# Patient Record
Sex: MALE | Race: WHITE | NOT HISPANIC OR LATINO
[De-identification: names, ages, dates, MRNs, and addresses within clinical notes are randomized per-mention and may not be internally consistent; named-entity substitution may affect disease eponyms.]

---

## 2018-11-09 ENCOUNTER — TRANSCRIPTION ENCOUNTER (OUTPATIENT)
Age: 18
End: 2018-11-09

## 2019-01-08 ENCOUNTER — TRANSCRIPTION ENCOUNTER (OUTPATIENT)
Age: 19
End: 2019-01-08

## 2020-02-10 ENCOUNTER — TRANSCRIPTION ENCOUNTER (OUTPATIENT)
Age: 20
End: 2020-02-10

## 2020-05-14 ENCOUNTER — EMERGENCY (EMERGENCY)
Facility: HOSPITAL | Age: 20
LOS: 1 days | Discharge: HOME | End: 2020-05-14
Attending: EMERGENCY MEDICINE | Admitting: EMERGENCY MEDICINE
Payer: COMMERCIAL

## 2020-05-14 ENCOUNTER — TRANSCRIPTION ENCOUNTER (OUTPATIENT)
Age: 20
End: 2020-05-14

## 2020-05-14 VITALS
HEIGHT: 68 IN | SYSTOLIC BLOOD PRESSURE: 128 MMHG | TEMPERATURE: 98 F | DIASTOLIC BLOOD PRESSURE: 77 MMHG | OXYGEN SATURATION: 100 % | WEIGHT: 190.04 LBS | RESPIRATION RATE: 16 BRPM | HEART RATE: 108 BPM

## 2020-05-14 PROCEDURE — 99283 EMERGENCY DEPT VISIT LOW MDM: CPT

## 2020-05-14 PROCEDURE — 70160 X-RAY EXAM OF NASAL BONES: CPT

## 2020-05-14 PROCEDURE — 99283 EMERGENCY DEPT VISIT LOW MDM: CPT | Mod: 25

## 2020-05-14 PROCEDURE — 70160 X-RAY EXAM OF NASAL BONES: CPT | Mod: 26

## 2020-05-14 RX ORDER — IBUPROFEN 200 MG
600 TABLET ORAL ONCE
Refills: 0 | Status: COMPLETED | OUTPATIENT
Start: 2020-05-14 | End: 2020-05-14

## 2020-05-14 RX ADMIN — Medication 600 MILLIGRAM(S): at 15:57

## 2020-05-14 NOTE — ED ADULT NURSE NOTE - NSIMPLEMENTINTERV_GEN_ALL_ED
Implemented All Universal Safety Interventions:  [place] to call system. Call bell, personal items and telephone within reach. Instruct patient to call for assistance. Room bathroom lighting operational. Non-slip footwear when patient is off stretcher. Physically safe environment: no spills, clutter or unnecessary equipment. Stretcher in lowest position, wheels locked, appropriate side rails in place.

## 2020-05-14 NOTE — ED PROVIDER NOTE - OBJECTIVE STATEMENT
pt is a 20yo male with no significant pmhx presents with nose injury x today. pt reports he ran into a pole while playing basketball. pt reports epistaxis and nose pain. pt denies loc. pt did not take anything for symptoms. pt denies fever, vision changes, cp, sob. tetanus utd

## 2020-05-14 NOTE — ED PROVIDER NOTE - NOSE FINDINGS
CLOTTED NASAL BLOOD/no inflammation/no rhinorrhea/+ nose with deformity ttp and ecchymosis. no septal hematoma.

## 2020-05-14 NOTE — ED PROVIDER NOTE - CARE PROVIDER_API CALL
Jassi Richmond  PLASTIC SURGERY  [address] [place] [address]  Phone: [phone]  Fax: [phone]  Follow Up Time: 1-3 Days

## 2020-05-14 NOTE — ED PROVIDER NOTE - PATIENT PORTAL LINK FT
You can access the FollowMyHealth Patient Portal offered by [facility] by registering at the following website: [email or web address]. By joining [facility]’s FollowMyHealth portal, you will also be able to view your health information using other applications (apps) compatible with our system.

## 2020-05-14 NOTE — ED PROVIDER NOTE - NSFOLLOWUPINSTRUCTIONS_ED_ALL_ED_FT
1. FOLLOW UP WITH YOUR PRIMARY DOCTOR IN 24-48 HOURS.   2. FOLLOW UP WITH ALL SPECIALIST DISCUSSED DURING YOUR VISIT.   3. TAKE ALL MEDICATIONS PRESCRIBED IN THE ER IF ANY ARE PRESCRIBED. CONTINUE YOUR HOME MEDICATIONS UNLESS OTHERWISE ADVISED DIFFERENTLY.   4. RETURN FOR WORSENING SYMPTOMS OR CONCERNS INCLUDING BUT NOT LIMITED TO FEVER, CHEST PAIN, OR TROUBLE BREATHING OR ANY OTHER CONCERNS  take augmentin daily as prescribed. take ibuprofen 600mg every 6 hours as needed for pain. ice nose. do not blow your nose. use over the counter saline nasal spray.

## 2020-05-14 NOTE — ED ADULT NURSE NOTE - OBJECTIVE STATEMENT
patient comes to ED for evaluation of nose injury reports running into a pole while playing basketball nasal deformity noted denies loss of consciousness

## 2020-05-14 NOTE — ED PROVIDER NOTE - ATTENDING CONTRIBUTION TO CARE
18 yo male no pmhx s/p ran into a pole while playing basketball, +epistaxis and nose pain, nonradiating, no LOC, no visual changes.  No nausea/vomiting.      Gen: Alert, NAD  Head/eyes: NC/AT, PERRL, EOMI, normal lids/conjunctiva, no scleral icterus  ENT: Bilateral TM WNL, normal hearing, patent oropharynx without erythema/exudate, uvula midline, no peritonsillar abscess, no tongue/uvula swelling, mild swelling at tip of nose, no obvious deformity  Neck: supple, no tenderness/meningismus/JVD, Trachea midline  Pulm/lung: Bilateral clear BS, normal resp effort, no wheeze/stridor/retractions  CV/heart: RRR, no M/R/G, +2 dist pulses (radial, pedal DP/PT, popliteal)  GI/Abd: soft, NT/ND, +BS, no guarding/rebound tenderness  Musculoskeletal: no edema/erythema/cyanosis, FROM in all extremities, no C/T/L spine ttp  Skin: no rash, no vesicles, no petechaie, no ecchymosis, no swelling  Neuro: AAOx3, CN 2-12 intact, normal sensation, 5/5 motor strength in all extremities, normal gait, no dysmetria     xray +tip of nose fx, d/c on augmentin f/u with Dr. Richmond outpatient

## 2020-05-14 NOTE — ED PROVIDER NOTE - PROGRESS NOTE DETAILS
+ nasal bone fx. pt advised on results. consulted plastics dr dixon advised pt can follow up in office. will rx augmentin. ibuprofen for pain. All imaging  reviewed. all results reviewed with pt including abnormal results. pt given a copy of results. pt advised to follow up with pmd regarding abnormal results. All questions answered and concerns addressed. pt verbalized understanding and agreement with plan and dx. pt advised on next step and when/where to follow up. pt advised on all take home and otc medications. pt advised to follow up with PMD. pt advised to return to ed for worsenng symptoms including fever, cp, sob. will dc.

## 2020-05-15 ENCOUNTER — EMERGENCY (EMERGENCY)
Facility: HOSPITAL | Age: 20
LOS: 1 days | Discharge: HOME | End: 2020-05-15
Attending: EMERGENCY MEDICINE
Payer: COMMERCIAL

## 2020-05-15 VITALS
SYSTOLIC BLOOD PRESSURE: 121 MMHG | TEMPERATURE: 98 F | HEART RATE: 63 BPM | DIASTOLIC BLOOD PRESSURE: 79 MMHG | OXYGEN SATURATION: 98 % | RESPIRATION RATE: 16 BRPM

## 2020-05-15 VITALS
WEIGHT: 190.04 LBS | HEART RATE: 76 BPM | OXYGEN SATURATION: 100 % | DIASTOLIC BLOOD PRESSURE: 62 MMHG | HEIGHT: 68 IN | RESPIRATION RATE: 16 BRPM | TEMPERATURE: 98 F | SYSTOLIC BLOOD PRESSURE: 113 MMHG

## 2020-05-15 DIAGNOSIS — J34.89 OTHER SPECIFIED DISORDERS OF NOSE AND NASAL SINUSES: ICD-10-CM

## 2020-05-15 PROBLEM — Z78.9 OTHER SPECIFIED HEALTH STATUS: Chronic | Status: ACTIVE | Noted: 2020-05-14

## 2020-05-15 PROCEDURE — 99283 EMERGENCY DEPT VISIT LOW MDM: CPT

## 2020-05-15 PROCEDURE — 70486 CT MAXILLOFACIAL W/O DYE: CPT | Mod: 26

## 2020-05-15 PROCEDURE — 99285 EMERGENCY DEPT VISIT HI MDM: CPT | Mod: 25

## 2020-05-15 PROCEDURE — 21320 CLSD TX NSL FX W/MNPJ&STABLJ: CPT | Mod: RT

## 2020-05-15 PROCEDURE — 70486 CT MAXILLOFACIAL W/O DYE: CPT

## 2020-05-15 RX ORDER — LIDOCAINE HYDROCHLORIDE AND EPINEPHRINE 10; 10 MG/ML; UG/ML
1 INJECTION, SOLUTION INFILTRATION; PERINEURAL ONCE
Refills: 0 | Status: DISCONTINUED | OUTPATIENT
Start: 2020-05-15 | End: 2020-05-19

## 2020-05-15 NOTE — ED ADULT NURSE NOTE - OBJECTIVE STATEMENT
Amb to ED to meet Dr Butler here. States he has a nasal fx Pt ran into a pole yesterday playing basketball. Denies LOC, dizziness, nausea or visual disturbances.  Sustained abrasions to left side of nose & between eyebrows. swelling noted to nose. Left upper tooth slightly impacted? States slight tenderness in tooth & along gumline

## 2020-05-15 NOTE — CONSULT NOTE ADULT - ASSESSMENT
A/P: 18 y/o s/p fall with nasal fracture s/p closed reduction/splint.  - Head elevation  - Nasal precuations:  No nose blowing, no heavy lifting. Saline nasal rinse as needed.  - Augmentin x 5 days  - F/U 5 days  - Patient educated on sequela of injury with likelihood of corrective nasal surgery needed    Thank You  Jassi Richmond MD  Plastic Surgery  [phone]

## 2020-05-15 NOTE — ED PROVIDER NOTE - NSFOLLOWUPINSTRUCTIONS_ED_ALL_ED_FT
Return to the ED for any new or worsening symptoms  Take your medication as prescribed  Complete the Augmentin as previously prescribed  Tylenol per label instructions as needed for pain   Do not blow your nose if you need to sneeze open your mouth   No activity until cleared by plastics   Follow up with a dentist as discussed for possible impacted tooth   Keep splint in place as instructed Return to the ED for any new or worsening symptoms  Take your medication as prescribed  Complete the Augmentin as previously prescribed  Tylenol per label instructions as needed for pain   Do not blow your nose if you need to sneeze open your mouth   No activity until cleared by plastics   Follow up with a dentist as discussed for possible impacted tooth   Keep splint in place as instructed  Follow up with Dr. Richmond Thursday as discussed

## 2020-05-15 NOTE — ED PROVIDER NOTE - OBJECTIVE STATEMENT
Pt is a 18 yo male who presents to the ED with a cc of nasal pain.  Pt reports that he has no significant past medical history.  Pt states that yesterday he was playing in a basketball game when he ran into a pole striking his head and nose.  He reports pain at site of impact.  Denies LOC.  Pt is not on blood thinners.  He states that he had initial epistaxis from bilateral nares which resolved. He was seen in the ED yesterday where x-ray relieved nasal fracture.  Pt was discharged home on po Augmentin.  He presents today for follow up after seeing his plastic surgeon.  Denies HA, visual changes, N/V, CP, SOB, abd pain.  Does have mild dull ache to nose.  Denies additional bleeding from nares.  Tetanus is UTD

## 2020-05-15 NOTE — ED ADULT NURSE NOTE - NS ED NURSE LEVEL OF CONSCIOUSNESS AFFECT
"  History     Chief Complaint   Patient presents with     Dizziness     Generalized Weakness     The history is provided by the patient and medical records.     Linda Spicer is a 85 year old female with a history of anemia, CKD III, sick sinus syndrome s/p pacemaker, atrial fibrillation (on Eliquis) and diastolic heart failure who presents to the ED with lightheadedness and generalized weakness onset this morning. She also reports intermittent nausea. She was recently hospitalized 10/8-10/12 with CHF. She had a cardioversion (10/16). The patient states she was on the toilet urinating and having a BM when she felt like \"lights were gonna go out\". She denies difficulty passing stool. She was okay yesterday. She is on Lasix. She denies history of MI, stoke or vertigo. She denies seizure, headache, nausea/vomiting, chest pain. She states she finished her breakfast this morning. The patient has a history of UTI and is on trimethoprim daily 50 mg.  Patient denies fevers some questional headache.  No reports of trauma.  No rash noted as noted no GI bleeding reported.  No dysuric symptoms reported.  No fevers no coughing or chest pain.  No focal neurological complaints generalized weakness noted.    I have reviewed the Medications, Allergies, Past Medical and Surgical History, and Social History in the [employer] system.  Past Medical History:   Diagnosis Date     Adjustment disorder with anxious mood 5/18/2015     Advanced directives, counseling/discussion 8/30/2012    Patient states has Advance Directive and will bring in a copy to clinic. 8/30/2012   [facility] Medical Assistant \       Anemia 9/25/2015     Basal cell cancer      CHF (congestive heart failure) (H) 9/18/2014     CKD (chronic kidney disease) stage 3, GFR 30-59 ml/min (H) 9/29/2015     DDD (degenerative disc disease), lumbar 9/25/2015     Diffuse idiopathic pulmonary fibrosis (H) 5/6/2013     Encounter for palliative care 5/18/2015     History of " blood transfusion 9/29/2015     Hypertension goal BP (blood pressure) < 140/90 9/7/2012     Hypothyroid 9/7/2012     Irritable bowel syndrome 10/29/2013     Macular degeneration      Macular degeneration, left eye 5/7/2013     Nondisplaced spiral fracture of shaft of humerus      Osteoporosis 8/13/2013     [employer] Update utility     RA (rheumatoid arthritis) (H) 5/7/2013     Rheumatic fever      Shingles      Spinal stenosis of lumbar region with neurogenic claudication 9/14/2015       Past Surgical History:   Procedure Laterality Date     ANESTHESIA CARDIOVERSION N/A 9/14/2018    Procedure: ANESTHESIA CARDIOVERSION;;  Surgeon: GENERIC ANESTHESIA PROVIDER;  Location: [facility] OR     ANESTHESIA CARDIOVERSION N/A 10/11/2018    Procedure: ANESTHESIA CARDIOVERSION;  Cardioversion;  Surgeon: GENERIC ANESTHESIA PROVIDER;  Location: [facility] OR     ANESTHESIA CARDIOVERSION N/A 10/16/2018    Procedure: Anesthesia Cardioversion ;  Surgeon: GENERIC ANESTHESIA PROVIDER;  Location: [facility] OR     APPENDECTOMY       BIOPSY      hemorrhoidectomy     ENT SURGERY      tonsillectomy     GYN SURGERY      3 D & C's     HYSTERECTOMY, PAP NO LONGER INDICATED       LAMINECTOMY LUMBAR ONE LEVEL N/A 10/13/2015    Procedure: LAMINECTOMY LUMBAR ONE LEVEL;  Surgeon: Fransico Toussaint MD;  Location: [facility] OR       Family History   Problem Relation Age of Onset     Hypertension Mother      Psychotic Disorder Father      Diabetes Son      Diabetes Daughter      Blood Disease Daughter        Social History   Substance Use Topics     Smoking status: Never Smoker     Smokeless tobacco: Never Used     Alcohol use Yes      Comment: rare wine        Current Facility-Administered Medications   Medication     acetaminophen (TYLENOL) Suppository 650 mg     acetaminophen (TYLENOL) tablet 650 mg     albuterol neb solution 2.5 mg     alum & mag hydroxide-simethicone (MYLANTA ES/MAALOX  ES) suspension 30 mL     amiodarone (PACERONE/CODARONE) tablet 200 mg     apixaban  ANTICOAGULANT (ELIQUIS) tablet 2.5 mg     azaTHIOprine (IMURAN) tablet 50 mg     carvedilol (COREG) tablet 6.25 mg     cetirizine (zyrTEC) tablet 10 mg     levothyroxine (SYNTHROID/LEVOTHROID) tablet 75 mcg     lidocaine (LMX4) cream     lidocaine 1 % 1 mL     medication instruction     ondansetron (ZOFRAN) injection 4 mg     Patient is already receiving anticoagulation with heparin, enoxaparin (LOVENOX), warfarin (COUMADIN)  or other anticoagulant medication     ranitidine (ZANTAC) tablet 150 mg     sodium chloride (PF) 0.9% PF flush 3 mL     sodium chloride (PF) 0.9% PF flush 3 mL     sodium chloride (PF) 0.9% PF flush 3 mL     sodium chloride (PF) 0.9% PF flush 3 mL     trimethoprim (TRIMPEX) half-tab 50 mg        Allergies   Allergen Reactions     Cephalexin Hcl Diarrhea     Gabapentin Other (See Comments)     Dizzsiness     Naproxen GI Disturbance     Perfume      Macrobid [Nitrofurantoin Anhydrous]      Possibly related to lung disease      Seasonal Allergies      Sulfa Drugs      Throat swelling     Xanax [Alprazolam] Other (See Comments)     Dizziness      Ciprofloxacin Itching and Rash       Review of Systems   Constitutional: Positive for activity change and fatigue. Negative for appetite change and fever.        Patient feeling lightheaded.   HENT: Negative for congestion.    Eyes: Negative for redness.   Respiratory: Negative for shortness of breath.    Cardiovascular: Negative for chest pain.   Gastrointestinal: Positive for nausea (intermittent). Negative for abdominal pain, blood in stool, constipation, diarrhea and vomiting.   Genitourinary: Positive for urgency. Negative for difficulty urinating and dysuria.        Patient describes some urgency as she is increased her diuretic recently   Musculoskeletal: Positive for gait problem. Negative for arthralgias, neck pain and neck stiffness.   Skin: Negative for color change, rash and wound.   Neurological: Positive for weakness (generalized) and  Calm/Appropriate light-headedness. Negative for dizziness, tremors, seizures, syncope, speech difficulty, numbness and headaches.        Lightheadedness without vertigo symptoms.  Patient feeling somewhat better lying here currently generally weak without focal complaints   Hematological: Bruises/bleeds easily.        Patient is on Eliquis   Psychiatric/Behavioral: Positive for decreased concentration and dysphoric mood. Negative for confusion and sleep disturbance.   All other systems reviewed and are negative.      Physical Exam   BP: 129/73  Pulse: 62  Heart Rate: 60  Temp: 97.8  F (36.6  C)  Resp: 18  SpO2: 99 %      Physical Exam   Constitutional: She is oriented to person, place, and time. She appears well-developed and well-nourished. She appears distressed.   Patient soft-spoken feeling generally weak but not confused.  No neurological focal findings seen.   HENT:   Head: Normocephalic and atraumatic.   Eyes: EOM are normal. Pupils are equal, round, and reactive to light. No scleral icterus.   Neck: Normal range of motion. Neck supple.   Cardiovascular: Regular rhythm.    Pulmonary/Chest: No stridor. No respiratory distress.   Abdominal: She exhibits no distension and no mass. There is tenderness. There is no rebound and no guarding.   Abdomen soft some generalized tenderness noted no pulsatile mass noted.  No rebound guarding rigidity no skin changes.   Musculoskeletal: She exhibits edema. She exhibits no tenderness or deformity.   Neurological: She is alert and oriented to person, place, and time. She has normal reflexes. No cranial nerve deficit. Coordination normal.   Nonfocal   Skin: Skin is warm and dry. No rash noted. She is not diaphoretic. No erythema. No pallor.   Psychiatric:   Flat affect   Nursing note and vitals reviewed.      ED Course     ED Course     Procedures         Patient evaluated ER.  With history of heart failure etc. patient received 250 cc normal saline fluid bolus.  EKG showed paced rhythm.  Head  CT was negative for any intracranial bleeding.  Abdominal CT done with abdominal pain did not show any free fluid or acute vascular abnormalities or ischemic changes or pneumatosis obstruction.  BNP was 1562 troponin negative.  Lipase 116.    Creatinine slightly elevated 1.79.  BUN is 37.  Sodium 134 potassium 4.4 glucose 191.  INR 1.4 hemoglobin 12.7 white count 6.2 platelets 255.    Discussed with family regarding our plan at this point patient still feeling generally weak but nothing focal at this point.  Will interrogate [employer] pacemaker.      [employer] pacemaker interrogated revealing patient has been in A. fib with rate controlled on 60 since 3 AM.    Discussed findings with .  Regulations either have patient follow-up with him next Tuesday or we can convert patient down here in the ER.  Discussed with patient along with family family all are concerned about cardioverter currently at this point feel that she may convert on her own and would like to admitted to the hospital overnight.  Discussed with  agreed will be admitted to cardiology at least as observation overnight.    Patient is able to eat here in the ER.  Otherwise stable.         EKG Interpretation:      Interpreted by Dr. Stephen  Time reviewed: 11:10  Symptoms at time of EKG: dizziness, generalized weakness   Rhythm: ventricular-paced rhythm with occasional supraventricular complexes  Rate: 62  Axis: Normal  Ectopy: none  Conduction: normal  ST Segments/ T Waves: No ST-T wave changes  Q Waves: none  Comparison to prior: Unchanged    Clinical Impression: no acute changes                Critical Care time:  none             Labs Ordered and Resulted from Time of ED Arrival Up to the Time of Departure from the ED   CBC WITH PLATELETS DIFFERENTIAL - Abnormal; Notable for the following:        Result Value    RBC Count 3.75 (*)      (*)     MCH 33.9 (*)     All other components within normal limits   INR - Abnormal; Notable for  the following:     INR 1.40 (*)     All other components within normal limits   COMPREHENSIVE METABOLIC PANEL - Abnormal; Notable for the following:     Glucose 191 (*)     Urea Nitrogen 37 (*)     Creatinine 1.79 (*)     GFR Estimate 27 (*)     GFR Estimate If Black 32 (*)     All other components within normal limits   ROUTINE UA WITH MICROSCOPIC REFLEX TO CULTURE - Abnormal; Notable for the following:     Leukocyte Esterase Urine Moderate (*)     RBC Urine 3 (*)     Squamous Epithelial /HPF Urine 3 (*)     Mucous Urine Present (*)     All other components within normal limits   PARTIAL THROMBOPLASTIN TIME   LIPASE   TROPONIN I   NT PROBNP INPATIENT   TSH WITH FREE T4 REFLEX   CARDIAC CONTINUOUS MONITORING   PULSE OXIMETRY NURSING   PERIPHERAL IV CATHETER   ABO/RH TYPE AND SCREEN     Results for orders placed or performed during the hospital encounter of 11/17/18   XR Chest Port 1 View    Narrative    XR CHEST PORT 1 VW 11/17/2018 12:24 PM    Comparison: Chest radiograph 8/30/2017    History: weakness near syncope;     Findings: Single AP view of the chest. Left chest wall cardiac device  leads in stable position. Cardiac silhouette is within normal limits,  unchanged. Basilar predominant interstitial opacities, similar to  mildly increased compared to the prior exam. No pleural effusion. No  pneumothorax. No acute airspace disease. Upper abdomen is  unremarkable.      Impression    Impression:   Mildly increased basilar reticular opacities from 8/30/2017, likely  atelectasis versus progression of interstitial lung disease.    I have personally reviewed the examination and initial interpretation  and I agree with the findings.    JOSEPH WHITE MD   CT Head w/o Contrast    Narrative    CT HEAD W/O CONTRAST 11/17/2018 11:48 AM    Provided History: weakness on anticoagulant;   ICD-10:    Comparison: CT sinuses 9/18/2012.    Technique: Using multidetector thin collimation helical acquisition  technique, axial,  coronal and sagittal CT images from the skull base  to the vertex were obtained without intravenous contrast.     Findings:    No intracranial hemorrhage, mass effect, or midline shift. The  ventricles are proportionate to the cerebral sulci. Mild diffuse  cerebral volume loss. Scattered subcortical and periventricular white  matter hypoattenuating foci. Calcified pineal gland. The gray to white  matter differentiation of the cerebral hemispheres is preserved. The  basal cisterns are patent. Calcifications of the bilateral carotid  siphons. Bilateral pseudophakia.    The visualized paranasal sinuses are clear. The mastoid air cells are  clear.       Impression    Impression:   1. No acute intracranial pathology.   2. Mild cerebral volume loss and chronic small vessel ischemic  changes.    I have personally reviewed the examination and initial interpretation  and I agree with the findings.    SIMIN DAVID MD   CT Abdomen Pelvis w/o Contrast    Narrative    EXAMINATION: CT ABDOMEN PELVIS W/O CONTRAST, 11/17/2018 11:47 AM    TECHNIQUE:  Helical CT images from the lung bases through the  symphysis pubis were obtained without IV contrast. Contrast dose: None    COMPARISON: SPECT scan 8/30/2018, CT pelvis 7/19/2018, outside CT  9/11/2017    HISTORY: abd pain and light headedness;     FINDINGS:    Abdomen and pelvis:   Unremarkable noncontrast appearance of the liver. Multiple prominent  cholelithiasis. No convincing evidence of cholecystitis, no adjacent  inflammatory thickening or cholecystic fluid. Spleen is within normal  limits. 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 Adrenal glands are unremarkable. Cystic pancreatic  lesion measuring 2.6 x 1.7 cm (series 3, image 100), not significantly  changed from 9/11/2017, not seen on 8/16/2016, additional smaller  cystic pancreatic foci. Partial fatty atrophy of the pancreas.  Bilateral renal cortical atrophy, not significantly changed.  No  significant hydronephrosis or hydroureter. Small amount of gas within  the bladder. No significant bladder wall thickening. Mild prominent  fat of the left inguinal canal. Postoperative changes of hysterectomy.    No dilated loops bowel. Moderate colonic stool. Colonic diverticulosis  without evidence of diverticulitis.    The abdominal aorta is within normal limits with moderate calcified  atherosclerotic plaque.    Lung bases: Basilar predominant interstitial lung changes with  subpleural honeycombing mild peripheral traction bronchiectasis and  peribronchovascular consolidation. No evidence of acute airspace  disease. No significant pleural effusion. The heart is enlarged with  partially visualized cardiac device leads in appropriate positions.  Biatrial enlargement. Mild cardiac annular calcifications, coronary  artery calcifications.    Bones and soft tissues: Chronic left L4 pars defects. Degenerative L4  on L5 grade 1 anterolisthesis. Subacute left sacral alar and  transverse S2-S3 transverse sacral fractures.       Impression    IMPRESSION:   1. No acute findings of the abdomen/pelvis to explain patient's  symptoms.  2. Cholelithiasis without evidence of cholecystitis.  3. Diverticulosis without evidence of diverticulitis.  4. Trace amount of gas within the bladder, correlate for recent  catheterization or evidence of cystitis.  5. Basilar interstitial lung disease.  6. Cystic pancreatic lesion, stable from 9/1/2017, not seen in 2016.  Differential includes cystic pancreatic neoplasm, IPMN, or sequela  pancreatitis. Consider follow-up MRI with contrast.    Consider Follow Up: Pancreatic cyst]    This report will be copied to the [facility] to ensure a  provider acknowledges the finding.     I have personally reviewed the examination and initial interpretation  and I agree with the findings.    JOSEPH WHITE MD   CBC with platelets differential   Result Value Ref Range    WBC 6.2 4.0 - 11.0  10e9/L    RBC Count 3.75 (L) 3.8 - 5.2 10e12/L    Hemoglobin 12.7 11.7 - 15.7 g/dL    Hematocrit 38.6 35.0 - 47.0 %     (H) 78 - 100 fl    MCH 33.9 (H) 26.5 - 33.0 pg    MCHC 32.9 31.5 - 36.5 g/dL    RDW 13.3 10.0 - 15.0 %    Platelet Count 255 150 - 450 10e9/L    Diff Method Automated Method     % Neutrophils 73.1 %    % Lymphocytes 18.5 %    % Monocytes 6.6 %    % Eosinophils 1.3 %    % Basophils 0.3 %    % Immature Granulocytes 0.2 %    Nucleated RBCs 0 0 /100    Absolute Neutrophil 4.6 1.6 - 8.3 10e9/L    Absolute Lymphocytes 1.2 0.8 - 5.3 10e9/L    Absolute Monocytes 0.4 0.0 - 1.3 10e9/L    Absolute Eosinophils 0.1 0.0 - 0.7 10e9/L    Absolute Basophils 0.0 0.0 - 0.2 10e9/L    Abs Immature Granulocytes 0.0 0 - 0.4 10e9/L    Absolute Nucleated RBC 0.0    INR   Result Value Ref Range    INR 1.40 (H) 0.86 - 1.14   Partial thromboplastin time   Result Value Ref Range    PTT 28 22 - 37 sec   Comprehensive metabolic panel   Result Value Ref Range    Sodium 134 133 - 144 mmol/L    Potassium 4.4 3.4 - 5.3 mmol/L    Chloride 98 94 - 109 mmol/L    Carbon Dioxide 28 20 - 32 mmol/L    Anion Gap 8 3 - 14 mmol/L    Glucose 191 (H) 70 - 99 mg/dL    Urea Nitrogen 37 (H) 7 - 30 mg/dL    Creatinine 1.79 (H) 0.52 - 1.04 mg/dL    GFR Estimate 27 (L) >60 mL/min/1.7m2    GFR Estimate If Black 32 (L) >60 mL/min/1.7m2    Calcium 9.3 8.5 - 10.1 mg/dL    Bilirubin Total 0.6 0.2 - 1.3 mg/dL    Albumin 3.4 3.4 - 5.0 g/dL    Protein Total 7.4 6.8 - 8.8 g/dL    Alkaline Phosphatase 60 40 - 150 U/L    ALT 22 0 - 50 U/L    AST 24 0 - 45 U/L   Lipase   Result Value Ref Range    Lipase 116 73 - 393 U/L   Troponin I   Result Value Ref Range    Troponin I ES <0.015 0.000 - 0.045 ug/L   Nt probnp inpatient (BNP)   Result Value Ref Range    N-Terminal Pro BNP Inpatient 1562 0 - 1800 pg/mL   UA with Microscopic reflex to Culture   Result Value Ref Range    Color Urine Light Yellow     Appearance Urine Cloudy     Glucose Urine Negative  NEG^Negative mg/dL    Bilirubin Urine Negative NEG^Negative    Ketones Urine Negative NEG^Negative mg/dL    Specific Gravity Urine 1.006 1.003 - 1.035    Blood Urine Negative NEG^Negative    pH Urine 7.0 5.0 - 7.0 pH    Protein Albumin Urine Negative NEG^Negative mg/dL    Urobilinogen mg/dL Normal 0.0 - 2.0 mg/dL    Nitrite Urine Negative NEG^Negative    Leukocyte Esterase Urine Moderate (A) NEG^Negative    Source Midstream Urine     WBC Urine 2 0 - 5 /HPF    RBC Urine 3 (H) 0 - 2 /HPF    Squamous Epithelial /HPF Urine 3 (H) 0 - 1 /HPF    Mucous Urine Present (A) NEG^Negative /LPF   TSH with free T4 reflex   Result Value Ref Range    TSH 0.82 0.40 - 4.00 mU/L   EKG 12 lead   Result Value Ref Range    Interpretation ECG Click View Image link to view waveform and result    ABO/Rh type and screen   Result Value Ref Range    ABO O     RH(D) Pos     Antibody Screen Neg     Test Valid Only At          [facility]    Specimen Expires 11/20/2018      *Note: Due to a large number of results and/or encounters for the requested time period, some results have not been displayed. A complete set of results can be found in Results Review.         Consults  Cardiology: Responded (Cards 1 Staff) (11/17/18 4384)    Assessments & Plan (with Medical Decision Making)  Is 85-year-old female presents emergency room with near syncopal episode.  Patient has known atrial fibrillation is on Eliquis for this.  Patient been compliant with this.  Patient noted this morning after going to bathroom she felt lightheaded and near syncopal but did not pass out.  No reports of seizure continued symptoms without neurological focal findings or complaints.  Patient evaluated the ER.  EKG showed a ventricular paced rhythm background shows A. fib.  Chest x-ray showed no heart failure urinalysis negative for infection.  Laboratory testing negative troponin.  BNP otherwise was 1500.  Other labs stable including  CBC chemistries.  Interrogated pacemaker showing atrial fibrillation since 3 AM last night.  Discussed with cardiology .  Patient at this point was offered cardioversion in the ER but declines at this point family feels she is somewhat weak and did not wanted to go home at this point will admit the patient overnight to cardiology service as observation to see if she improves and patient was then admitted.  Family agrees.           I have reviewed the nursing notes.    I have reviewed the findings, diagnosis, plan and need for follow up with the patient.    Current Discharge Medication List          Final diagnoses:   Atrial fibrillation with controlled ventricular response (H)   Near syncope   Weakness generalized   I, Erum Gray, am serving as a trained medical scribe to document services personally performed by Ángel Stephen MD, based on the provider's statements to me.   I, Ángel Stephen MD, was physically present and have reviewed and verified the accuracy of this note documented by Erum Gray.      11/17/2018   [facility] EMERGENCY DEPARTMENT      This note was created at least in part by the use of dragon voice dictation system. Inadvertent typographical errors may still exist.  Ángel Stephen MD.         Ángel Stephen MD  11/17/18 [id number]

## 2020-05-15 NOTE — ED PROVIDER NOTE - PROGRESS NOTE DETAILS
pt seen by plastics Dr. Richmond nasal fracture re-set and splinted.  Pt tolerated well.  Plastics requests CT maxio-facial bones.  Bilateral nasal fracture noted.  Pt stable for discharge home.  Will complete Augmentin and follow up with specialist in office.  Advised to follow up with his dentist for possible tooth impaction

## 2020-05-15 NOTE — ED PROVIDER NOTE - PATIENT PORTAL LINK FT
You can access the FollowMyHealth Patient Portal offered by [facility] by registering at the following website: [email or web address]. By joining [email or web address]’s FollowMyHealth portal, you will also be able to view your health information using other applications (apps) compatible with our system.

## 2020-05-15 NOTE — ED PROVIDER NOTE - CLINICAL SUMMARY MEDICAL DECISION MAKING FREE TEXT BOX
Pt is a 18 yo male who presents to the ED with a cc of nasal pain.  Pt reports that he has no significant past medical history.  Pt states that yesterday he was playing in a basketball game when he ran into a pole striking his head and nose.  He reports pain at site of impact.  Denies LOC.  Pt is not on blood thinners.  He states that he had initial epistaxis from bilateral nares which resolved. He was seen in the ED yesterday where x-ray relieved nasal fracture.  Pt was discharged home on po Augmentin.  He presents today for follow up after seeing his plastic surgeon.  Denies HA, visual changes, N/V, CP, SOB, abd pain.  Does have mild dull ache to nose.  Denies additional bleeding from nares.  Tetanus is UTD Pt is a 18 yo male who presents to the ED with a cc of nasal pain.  Pt reports that he has no significant past medical history.  Pt states that yesterday he was playing in a basketball game when he ran into a pole striking his head and nose.  He reports pain at site of impact.  Denies LOC.  Pt is not on blood thinners.  He states that he had initial epistaxis from bilateral nares which resolved. He was seen in the ED yesterday where x-ray relieved nasal fracture.  Pt was discharged home on po Augmentin.  He presents today for follow up after seeing his plastic surgeon.  Denies HA, visual changes, N/V, CP, SOB, abd pain.  Does have mild dull ache to nose.  Denies additional bleeding from nares.  Tetanus is UTD.  Plastics here already to re-set pt nasal fracture, requesting CT facial bones.  Will order and monitor pt

## 2020-05-15 NOTE — ED PROVIDER NOTE - PHYSICAL EXAMINATION
small abrasion between bilateral eyebrows closed no signs of infection  abrasion to nasal bridge no active bleeding no signs of infection  no septal hematoma noted  No dental fracture noted ? impaction of tooth #11 not loose

## 2020-05-15 NOTE — CONSULT NOTE ADULT - SUBJECTIVE AND OBJECTIVE BOX
ORVILLE CHOW  [id number]  Ronda  ED    19yMale presents to ER s/p basketball injury with nasal deformity, bleeding and pain.  Patient reports falling onto the face resulting in bleeding and pain.  Patient reports difficulty breathing through nose.  Patient denies facial weakness or numbness changes in teeth alignment, or LOC.      PMhx/PSHx:  MEWS Score  No pertinent past medical history  Nasal fracture  No significant past surgical history      All: No Known Allergies      SHx:  No toxic habits.    T(C): 36.7 (05-15-20 @ 11:28), Max: 36.9 (05-15-20 @ 10:10)  HR: 63 (05-15-20 @ 11:28) (63 - 76)  BP: 121/79 (05-15-20 @ 11:28) (113/62 - 121/79)  RR: 16 (05-15-20 @ 11:28) (16 - 16)  SpO2: 98% (05-15-20 @ 11:28) (98% - 100%)  NAD  HEENT:  EOMi.  PERRLA.  + Perinasal swelling, ecchymosis and tenderness wtih deviation.  Intranasal: Septal deviation without hematoma.  Intraoral:  No injuries.  Normal occlusion.  CN2-12 intact.    CT Maxillofacial: Bilateral nasal bone fracture.       Procedure:  Right and left infraorbital block, nasal dorsal block.  Manipulation of nasal bones with outfracture performed.  Steristrips applied with splint.

## 2020-05-15 NOTE — ED PROVIDER NOTE - CARE PROVIDER_API CALL
Jassi Richmond  PLASTIC SURGERY  [address] [place] [address]  Phone: [phone]  Fax: [phone]  Follow Up Time:

## 2021-01-13 ENCOUNTER — TRANSCRIPTION ENCOUNTER (OUTPATIENT)
Age: 21
End: 2021-01-13

## 2021-02-26 ENCOUNTER — TRANSCRIPTION ENCOUNTER (OUTPATIENT)
Age: 21
End: 2021-02-26

## 2021-04-07 ENCOUNTER — TRANSCRIPTION ENCOUNTER (OUTPATIENT)
Age: 21
End: 2021-04-07

## 2021-05-25 NOTE — ED ADULT NURSE NOTE - NSFALLRSKINDICATORS_ED_ALL_ED

## 2024-04-12 NOTE — ED PROVIDER NOTE - SKIN [+], MLM
No protocol for requested medication.    Medication: tizanidine  Last office visit date: 10/30/23  Pharmacy: [id number] [place] [facility] [address]    Order pended, routed to clinician for review.      ABRASION

## 2024-10-12 ENCOUNTER — NON-APPOINTMENT (OUTPATIENT)
Age: 24
End: 2024-10-12

## 2025-03-26 ENCOUNTER — OFFICE (OUTPATIENT)
Dept: URBAN - METROPOLITAN AREA CLINIC 12 | Facility: CLINIC | Age: 25
Setting detail: OPHTHALMOLOGY
End: 2025-03-26
Payer: COMMERCIAL

## 2025-03-26 ENCOUNTER — RX ONLY (RX ONLY)
Age: 25
End: 2025-03-26

## 2025-03-26 DIAGNOSIS — H40.003: ICD-10-CM

## 2025-03-26 PROCEDURE — 92020 GONIOSCOPY: CPT | Performed by: STUDENT IN AN ORGANIZED HEALTH CARE EDUCATION/TRAINING PROGRAM

## 2025-03-26 PROCEDURE — 92133 CPTRZD OPH DX IMG PST SGM ON: CPT | Performed by: STUDENT IN AN ORGANIZED HEALTH CARE EDUCATION/TRAINING PROGRAM

## 2025-03-26 PROCEDURE — 92083 EXTENDED VISUAL FIELD XM: CPT | Performed by: STUDENT IN AN ORGANIZED HEALTH CARE EDUCATION/TRAINING PROGRAM

## 2025-03-26 PROCEDURE — 92002 INTRM OPH EXAM NEW PATIENT: CPT | Performed by: STUDENT IN AN ORGANIZED HEALTH CARE EDUCATION/TRAINING PROGRAM

## 2025-03-26 ASSESSMENT — REFRACTION_CURRENTRX
OD_CYLINDER: -0.50
OD_OVR_VA: 20/
OS_SPHERE: -5.75
OS_VPRISM_DIRECTION: SV
OD_VPRISM_DIRECTION: SV
OD_AXIS: 025
OS_CYLINDER: -0.50
OS_AXIS: 162
OD_SPHERE: -6.00
OS_OVR_VA: 20/

## 2025-03-26 ASSESSMENT — REFRACTION_AUTOREFRACTION
OS_SPHERE: -5.75
OD_SPHERE: -6.25
OS_CYLINDER: -1.00
OD_AXIS: 019
OS_AXIS: 161
OD_CYLINDER: -1.25

## 2025-03-26 ASSESSMENT — KERATOMETRY
OS_K1POWER_DIOPTERS: 42.50
OD_AXISANGLE_DEGREES: 097
OS_K2POWER_DIOPTERS: 44.00
OD_K1POWER_DIOPTERS: 42.00
OS_AXISANGLE_DEGREES: 083
OD_K2POWER_DIOPTERS: 43.75

## 2025-03-26 ASSESSMENT — CONFRONTATIONAL VISUAL FIELD TEST (CVF)
OD_FINDINGS: FULL
OS_FINDINGS: FULL

## 2025-03-26 ASSESSMENT — VISUAL ACUITY
OD_BCVA: 20/25-2
OS_BCVA: 20/40

## 2025-03-26 ASSESSMENT — TONOMETRY: OD_IOP_MMHG: 21

## 2025-07-28 PROBLEM — Z00.00 ENCOUNTER FOR PREVENTIVE HEALTH EXAMINATION: Status: ACTIVE | Noted: 2025-07-28

## 2025-07-30 ENCOUNTER — APPOINTMENT (OUTPATIENT)
Dept: ORTHOPEDIC SURGERY | Facility: CLINIC | Age: 25
End: 2025-07-30
Payer: COMMERCIAL

## 2025-07-30 VITALS — HEIGHT: 68 IN | WEIGHT: 190 LBS | BODY MASS INDEX: 28.79 KG/M2

## 2025-07-30 DIAGNOSIS — M79.18 MYALGIA, OTHER SITE: ICD-10-CM

## 2025-07-30 DIAGNOSIS — S63.502A UNSPECIFIED SPRAIN OF LEFT WRIST, INITIAL ENCOUNTER: ICD-10-CM

## 2025-07-30 DIAGNOSIS — Z78.9 OTHER SPECIFIED HEALTH STATUS: ICD-10-CM

## 2025-07-30 PROCEDURE — 99204 OFFICE O/P NEW MOD 45 MIN: CPT

## 2025-07-30 PROCEDURE — 73110 X-RAY EXAM OF WRIST: CPT | Mod: 50

## 2025-07-30 RX ORDER — IBUPROFEN 600 MG/1
600 TABLET, FILM COATED ORAL TWICE DAILY
Qty: 28 | Refills: 0 | Status: ACTIVE | COMMUNITY
Start: 2025-07-30 | End: 1900-01-01